# Patient Record
Sex: MALE | Race: WHITE | HISPANIC OR LATINO | URBAN - METROPOLITAN AREA
[De-identification: names, ages, dates, MRNs, and addresses within clinical notes are randomized per-mention and may not be internally consistent; named-entity substitution may affect disease eponyms.]

---

## 2019-08-15 ENCOUNTER — OUTPATIENT (OUTPATIENT)
Dept: OUTPATIENT SERVICES | Facility: HOSPITAL | Age: 74
LOS: 1 days | End: 2019-08-15
Payer: MEDICARE

## 2019-08-15 ENCOUNTER — LABORATORY RESULT (OUTPATIENT)
Age: 74
End: 2019-08-15

## 2019-08-15 ENCOUNTER — APPOINTMENT (OUTPATIENT)
Dept: INTERNAL MEDICINE | Facility: CLINIC | Age: 74
End: 2019-08-15
Payer: MEDICARE

## 2019-08-15 VITALS
HEIGHT: 65.5 IN | SYSTOLIC BLOOD PRESSURE: 142 MMHG | WEIGHT: 200 LBS | OXYGEN SATURATION: 98 % | BODY MASS INDEX: 32.92 KG/M2 | HEART RATE: 89 BPM | DIASTOLIC BLOOD PRESSURE: 80 MMHG

## 2019-08-15 VITALS — DIASTOLIC BLOOD PRESSURE: 70 MMHG | SYSTOLIC BLOOD PRESSURE: 132 MMHG

## 2019-08-15 DIAGNOSIS — M54.9 DORSALGIA, UNSPECIFIED: ICD-10-CM

## 2019-08-15 DIAGNOSIS — Z82.49 FAMILY HISTORY OF ISCHEMIC HEART DISEASE AND OTHER DISEASES OF THE CIRCULATORY SYSTEM: ICD-10-CM

## 2019-08-15 DIAGNOSIS — Z00.00 ENCOUNTER FOR GENERAL ADULT MEDICAL EXAMINATION W/OUT ABNORMAL FINDINGS: ICD-10-CM

## 2019-08-15 DIAGNOSIS — Z72.0 TOBACCO USE: ICD-10-CM

## 2019-08-15 DIAGNOSIS — E66.9 OBESITY, UNSPECIFIED: ICD-10-CM

## 2019-08-15 DIAGNOSIS — Z87.898 PERSONAL HISTORY OF OTHER SPECIFIED CONDITIONS: ICD-10-CM

## 2019-08-15 DIAGNOSIS — F17.200 NICOTINE DEPENDENCE, UNSPECIFIED, UNCOMPLICATED: ICD-10-CM

## 2019-08-15 DIAGNOSIS — Z78.9 OTHER SPECIFIED HEALTH STATUS: ICD-10-CM

## 2019-08-15 DIAGNOSIS — H93.11 TINNITUS, RIGHT EAR: ICD-10-CM

## 2019-08-15 DIAGNOSIS — Z87.39 PERSONAL HISTORY OF OTHER DISEASES OF THE MUSCULOSKELETAL SYSTEM AND CONNECTIVE TISSUE: ICD-10-CM

## 2019-08-15 DIAGNOSIS — I10 ESSENTIAL (PRIMARY) HYPERTENSION: ICD-10-CM

## 2019-08-15 LAB
ESTIMATED AVERAGE GLUCOSE: 103 MG/DL — SIGNIFICANT CHANGE UP (ref 68–114)
HBA1C BLD-MCNC: 5.2 % — SIGNIFICANT CHANGE UP (ref 4–5.6)
HCT VFR BLD CALC: 46.1 % — SIGNIFICANT CHANGE UP (ref 39–50)
HGB BLD-MCNC: 14.8 G/DL — SIGNIFICANT CHANGE UP (ref 13–17)
MCHC RBC-ENTMCNC: 29.3 PG — SIGNIFICANT CHANGE UP (ref 27–34)
MCHC RBC-ENTMCNC: 32.1 GM/DL — SIGNIFICANT CHANGE UP (ref 32–36)
MCV RBC AUTO: 91.3 FL — SIGNIFICANT CHANGE UP (ref 80–100)
PLATELET # BLD AUTO: 148 K/UL — LOW (ref 150–400)
RBC # BLD: 5.05 M/UL — SIGNIFICANT CHANGE UP (ref 4.2–5.8)
RBC # FLD: 13.9 % — SIGNIFICANT CHANGE UP (ref 10.3–14.5)
WBC # BLD: 6.92 K/UL — SIGNIFICANT CHANGE UP (ref 3.8–10.5)
WBC # FLD AUTO: 6.92 K/UL — SIGNIFICANT CHANGE UP (ref 3.8–10.5)

## 2019-08-15 PROCEDURE — 99204 OFFICE O/P NEW MOD 45 MIN: CPT | Mod: GC

## 2019-08-15 PROCEDURE — 90715 TDAP VACCINE 7 YRS/> IM: CPT

## 2019-08-15 PROCEDURE — 90670 PCV13 VACCINE IM: CPT

## 2019-08-15 PROCEDURE — 87389 HIV-1 AG W/HIV-1&-2 AB AG IA: CPT

## 2019-08-15 PROCEDURE — G0463: CPT | Mod: 25

## 2019-08-15 PROCEDURE — 80053 COMPREHEN METABOLIC PANEL: CPT

## 2019-08-15 PROCEDURE — G0009: CPT | Mod: XU

## 2019-08-15 PROCEDURE — 80061 LIPID PANEL: CPT

## 2019-08-15 PROCEDURE — 90471 IMMUNIZATION ADMIN: CPT

## 2019-08-15 PROCEDURE — 83036 HEMOGLOBIN GLYCOSYLATED A1C: CPT

## 2019-08-15 PROCEDURE — 85027 COMPLETE CBC AUTOMATED: CPT

## 2019-08-15 RX ORDER — PSYLLIUM SEED
48.57 PACKET (EA) ORAL
Refills: 0 | Status: ACTIVE | COMMUNITY
Start: 2019-08-15

## 2019-08-16 DIAGNOSIS — Z72.0 TOBACCO USE: ICD-10-CM

## 2019-08-16 DIAGNOSIS — H93.11 TINNITUS, RIGHT EAR: ICD-10-CM

## 2019-08-16 DIAGNOSIS — E66.9 OBESITY, UNSPECIFIED: ICD-10-CM

## 2019-08-16 DIAGNOSIS — Z00.00 ENCOUNTER FOR GENERAL ADULT MEDICAL EXAMINATION WITHOUT ABNORMAL FINDINGS: ICD-10-CM

## 2019-08-16 DIAGNOSIS — M54.9 DORSALGIA, UNSPECIFIED: ICD-10-CM

## 2019-08-16 LAB
ALBUMIN SERPL ELPH-MCNC: 4.1 G/DL — SIGNIFICANT CHANGE UP (ref 3.3–5)
ALP SERPL-CCNC: 80 U/L — SIGNIFICANT CHANGE UP (ref 40–120)
ALT FLD-CCNC: 18 U/L — SIGNIFICANT CHANGE UP (ref 10–45)
ANION GAP SERPL CALC-SCNC: 12 MMOL/L — SIGNIFICANT CHANGE UP (ref 5–17)
AST SERPL-CCNC: 14 U/L — SIGNIFICANT CHANGE UP (ref 10–40)
BILIRUB SERPL-MCNC: 0.4 MG/DL — SIGNIFICANT CHANGE UP (ref 0.2–1.2)
BUN SERPL-MCNC: 21 MG/DL — SIGNIFICANT CHANGE UP (ref 7–23)
CALCIUM SERPL-MCNC: 9.1 MG/DL — SIGNIFICANT CHANGE UP (ref 8.4–10.5)
CHLORIDE SERPL-SCNC: 107 MMOL/L — SIGNIFICANT CHANGE UP (ref 96–108)
CHOLEST SERPL-MCNC: 204 MG/DL — HIGH (ref 10–199)
CO2 SERPL-SCNC: 22 MMOL/L — SIGNIFICANT CHANGE UP (ref 22–31)
CREAT SERPL-MCNC: 0.96 MG/DL — SIGNIFICANT CHANGE UP (ref 0.5–1.3)
GLUCOSE SERPL-MCNC: 92 MG/DL — SIGNIFICANT CHANGE UP (ref 70–99)
HDLC SERPL-MCNC: 29 MG/DL — LOW
HIV 1+2 AB+HIV1 P24 AG SERPL QL IA: SIGNIFICANT CHANGE UP
LIPID PNL WITH DIRECT LDL SERPL: SIGNIFICANT CHANGE UP MG/DL
POTASSIUM SERPL-MCNC: 4.3 MMOL/L — SIGNIFICANT CHANGE UP (ref 3.5–5.3)
POTASSIUM SERPL-SCNC: 4.3 MMOL/L — SIGNIFICANT CHANGE UP (ref 3.5–5.3)
PROT SERPL-MCNC: 6.8 G/DL — SIGNIFICANT CHANGE UP (ref 6–8.3)
SODIUM SERPL-SCNC: 141 MMOL/L — SIGNIFICANT CHANGE UP (ref 135–145)
TOTAL CHOLESTEROL/HDL RATIO MEASUREMENT: 7 RATIO — SIGNIFICANT CHANGE UP (ref 3.4–9.6)
TRIGL SERPL-MCNC: 417 MG/DL — HIGH (ref 10–149)

## 2019-08-16 NOTE — HEALTH RISK ASSESSMENT
[] : Yes [0-5] : 0-5 [Fully functional (bathing, dressing, toileting, transferring, walking, feeding)] : Fully functional (bathing, dressing, toileting, transferring, walking, feeding) [Fully functional (using the telephone, shopping, preparing meals, housekeeping, doing laundry, using] : Fully functional and needs no help or supervision to perform IADLs (using the telephone, shopping, preparing meals, housekeeping, doing laundry, using transportation, managing medications and managing finances) [No falls in past year] : Patient reported no falls in the past year

## 2019-08-18 NOTE — ASSESSMENT
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 Counseled on avoiding NSAIDs given age\par \par #R tinnitus\par No red flag rhythmic pattern of tinnitus, gross hearing intact\par -Audiology referral given, if shows unilateral sensorineural hearing loss may need MRI brain to r/o schwannoma\par \par #Current some day smoker\par -Counseled on risks of smoking and recommended pt quit. Pt states he is ready to quit\par \par #HCM\par -GI referral for screening colo, pt has >10 years expected life expectancy\par -Prevnar and TDAP given, needs Pneumovax 8/2020, needs Shingrix\par -AAA screening referral given, 3.5 pack years\par -Depression screen neg\par \par RTC prn, CPE in 1 year, needs Pneumovax 8/2020\par D/w Dr. Guadalupe

## 2019-08-18 NOTE — HISTORY OF PRESENT ILLNESS
[de-identified] : 72yo M Hebrew-speaking with no sig PMHx presents to establish care after not coming to clinic since 2012.   #[id number], [id number].\par \par Reporting back pain which is intermittent upper back to base of skull which started 1/2019, had stress around time it started. This pain is causing HA of R temporal/parietal region and R ear. Back pain is worse when he bends neck forward and back. Has the upper back pain only when he bends his neck, bends forward, lifts something, or when turns over in sleep. Is unable to describe quality of pain but it's not shooting or electric pain and does not radiate down arms, severity 4/10. Has some high-pitched ringing in R ear which started before the neck pain. No numbness, tingling, weakness, falls, LOC, No longer gets palpitations, reports previous palpitations was during time of stress. Got X-rays of neck/back in [facility] and was told one of the discs was out of place affecting a nerve.\par \par Had colonoscopy once 10 years at [facility] which he reports was normal.\par \par PSHx: fell from house leading to compression fracture of Lumbar vertebra s/p metal plate, 1998\par \par SH: Lives in [facility], visits [place] for medical care, lives with brother, BRIANNA, niece. Staying with daughter in [place]. Smokes tob 4-5 days/week, 2-3 cig/day (1 pack every 2 weeks). Started age 19. +social etoh 1-2 beers. 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 Single, sexually active with 1 female partner. Goes dancing for fun. Ambulates without cane/walker.\par

## 2019-08-18 NOTE — PHYSICAL EXAM
[Normal Sclera/Conjunctiva] : normal sclera/conjunctiva [Normal Outer Ear/Nose] : the outer ears and nose were normal in appearance [Normal Oropharynx] : the oropharynx was normal [Supple] : supple [No Edema] : there was no peripheral edema [Normal] : soft, non-tender, non-distended, no masses palpated, no HSM and normal bowel sounds [No CVA Tenderness] : no CVA  tenderness [No Spinal Tenderness] : no spinal tenderness [No Joint Swelling] : no joint swelling [No Rash] : no rash [Coordination Grossly Intact] : coordination grossly intact [No Focal Deficits] : no focal deficits [Normal Gait] : normal gait [Normal Affect] : the affect was normal [Normal Insight/Judgement] : insight and judgment were intact [de-identified] : L [de-identified] : no stepoffs/deformities of spin [de-identified] : full neck AROM [de-identified] : b/l UE: sensation to light touch intact, strength 5/5

## 2019-08-18 NOTE — REVIEW OF SYSTEMS
[Headache] : headache [Negative] : Heme/Lymph [Earache] : no earache [Hearing Loss] : no hearing loss [Nosebleeds] : no nosebleeds [Postnasal Drip] : no postnasal drip [Nasal Discharge] : no nasal discharge [Sore Throat] : no sore throat [Hoarseness] : no hoarseness [Dizziness] : no dizziness [Fainting] : no fainting [Confusion] : no confusion [Unsteady Walk] : no ataxia [Memory Loss] : no memory loss [FreeTextEntry4] : +R tinnitus

## 2019-08-19 ENCOUNTER — OUTPATIENT (OUTPATIENT)
Dept: OUTPATIENT SERVICES | Facility: HOSPITAL | Age: 74
LOS: 1 days | End: 2019-08-19
Payer: MEDICARE

## 2019-08-19 ENCOUNTER — APPOINTMENT (OUTPATIENT)
Dept: ULTRASOUND IMAGING | Facility: CLINIC | Age: 74
End: 2019-08-19
Payer: MEDICARE

## 2019-08-19 DIAGNOSIS — Z72.0 TOBACCO USE: ICD-10-CM

## 2019-08-19 PROCEDURE — 76775 US EXAM ABDO BACK WALL LIM: CPT

## 2019-08-19 PROCEDURE — 76775 US EXAM ABDO BACK WALL LIM: CPT | Mod: 26

## 2019-08-22 ENCOUNTER — OUTPATIENT (OUTPATIENT)
Dept: OUTPATIENT SERVICES | Facility: HOSPITAL | Age: 74
LOS: 1 days | Discharge: HOME | End: 2019-08-22

## 2019-08-22 ENCOUNTER — APPOINTMENT (OUTPATIENT)
Dept: SPEECH THERAPY | Facility: CLINIC | Age: 74
End: 2019-08-22

## 2019-08-28 ENCOUNTER — APPOINTMENT (OUTPATIENT)
Dept: INTERNAL MEDICINE | Facility: CLINIC | Age: 74
End: 2019-08-28

## 2019-08-28 VITALS
SYSTOLIC BLOOD PRESSURE: 115 MMHG | HEART RATE: 79 BPM | BODY MASS INDEX: 32.61 KG/M2 | DIASTOLIC BLOOD PRESSURE: 90 MMHG | WEIGHT: 199 LBS | OXYGEN SATURATION: 97 %

## 2019-08-30 NOTE — END OF VISIT
[] : Resident [FreeTextEntry3] : 73 year-old M with PMH as above who presented to the clinic asking for a letter in Telugu about his health issues. No imaging available for review, [facility]. No current complaints.

## 2019-08-30 NOTE — PHYSICAL EXAM
[No Acute Distress] : no acute distress [Well-Appearing] : well-appearing [Normal Sclera/Conjunctiva] : normal sclera/conjunctiva [EOMI] : extraocular movements intact [Normal Outer Ear/Nose] : the outer ears and nose were normal in appearance [Normal Oropharynx] : the oropharynx was normal [Supple] : supple [No Respiratory Distress] : no respiratory distress  [No Accessory Muscle Use] : no accessory muscle use [Normal Rate] : normal rate  [Clear to Auscultation] : lungs were clear to auscultation bilaterally [Regular Rhythm] : with a regular rhythm [Normal S1, S2] : normal S1 and S2 [No Edema] : there was no peripheral edema [No Extremity Clubbing/Cyanosis] : no extremity clubbing/cyanosis [Soft] : abdomen soft [Non Tender] : non-tender [Non-distended] : non-distended [No Joint Swelling] : no joint swelling [Grossly Normal Strength/Tone] : grossly normal strength/tone [No Rash] : no rash [Coordination Grossly Intact] : coordination grossly intact [No Focal Deficits] : no focal deficits [Normal Gait] : normal gait [Normal Affect] : the affect was normal [Normal Insight/Judgement] : insight and judgment were intact

## 2019-08-30 NOTE — DATA REVIEWED
[No studies available for review at this time.] : No studies available for review at this time. [FreeTextEntry1] : No imaging studies available for review at this time.

## 2019-08-30 NOTE — HISTORY OF PRESENT ILLNESS
[FreeTextEntry1] : Follow up [de-identified] : The patient is a 72 yo man with R tinnitus here for follow up.  He has a trip next week on the 9th to [facility] and needs a medical letter regarding management of his neck pain. He needs a letter from when he was in the clinic . Tomorrow have an appointment for physical therapy. The place where you work in [facility]. Needs the letter to be in Tunisian. There are 4 in the office, from there here for permission to be seen for the neck. No trauma to the neck, no fall, hurts with movement.  No xray imaging here to review.\par

## 2019-09-06 DIAGNOSIS — E78.1 PURE HYPERGLYCERIDEMIA: ICD-10-CM

## 2019-09-06 DIAGNOSIS — E78.5 HYPERLIPIDEMIA, UNSPECIFIED: ICD-10-CM

## 2019-09-06 RX ORDER — ATORVASTATIN CALCIUM 40 MG/1
40 TABLET, FILM COATED ORAL DAILY
Qty: 60 | Refills: 0 | Status: ACTIVE | COMMUNITY
Start: 2019-09-06 | End: 1900-01-01

## 2019-10-02 DIAGNOSIS — H90.3 SENSORINEURAL HEARING LOSS, BILATERAL: ICD-10-CM

## 2020-01-21 ENCOUNTER — APPOINTMENT (OUTPATIENT)
Dept: GASTROENTEROLOGY | Facility: HOSPITAL | Age: 75
End: 2020-01-21

## 2024-01-20 ENCOUNTER — EMERGENCY (EMERGENCY)
Facility: HOSPITAL | Age: 79
LOS: 1 days | Discharge: HOME | End: 2024-01-20
Attending: EMERGENCY MEDICINE
Payer: MEDICARE

## 2024-01-20 ENCOUNTER — EMERGENCY (EMERGENCY)
Facility: HOSPITAL | Age: 79
LOS: 1 days | Discharge: HOME | End: 2024-01-20
Attending: EMERGENCY MEDICINE | Admitting: EMERGENCY MEDICINE
Payer: MEDICARE

## 2024-01-20 VITALS
TEMPERATURE: 97 F | RESPIRATION RATE: 18 BRPM | SYSTOLIC BLOOD PRESSURE: 153 MMHG | HEART RATE: 82 BPM | OXYGEN SATURATION: 97 % | DIASTOLIC BLOOD PRESSURE: 79 MMHG

## 2024-01-20 VITALS
DIASTOLIC BLOOD PRESSURE: 75 MMHG | HEART RATE: 82 BPM | OXYGEN SATURATION: 97 % | TEMPERATURE: 98 F | RESPIRATION RATE: 18 BRPM | SYSTOLIC BLOOD PRESSURE: 135 MMHG

## 2024-01-20 VITALS
DIASTOLIC BLOOD PRESSURE: 79 MMHG | WEIGHT: 199.96 LBS | OXYGEN SATURATION: 98 % | RESPIRATION RATE: 18 BRPM | HEART RATE: 82 BPM | SYSTOLIC BLOOD PRESSURE: 161 MMHG | TEMPERATURE: 97 F | HEIGHT: 65.5 IN

## 2024-01-20 VITALS
SYSTOLIC BLOOD PRESSURE: 127 MMHG | TEMPERATURE: 98 F | OXYGEN SATURATION: 98 % | RESPIRATION RATE: 20 BRPM | HEIGHT: 65.5 IN | DIASTOLIC BLOOD PRESSURE: 77 MMHG | HEART RATE: 83 BPM | WEIGHT: 199.96 LBS

## 2024-01-20 LAB
ALBUMIN SERPL ELPH-MCNC: 4.2 G/DL — SIGNIFICANT CHANGE UP (ref 3.3–5)
ALP SERPL-CCNC: 108 U/L — SIGNIFICANT CHANGE UP (ref 40–120)
ALT FLD-CCNC: 28 U/L — SIGNIFICANT CHANGE UP (ref 10–45)
ANION GAP SERPL CALC-SCNC: 12 MMOL/L — SIGNIFICANT CHANGE UP (ref 5–17)
AST SERPL-CCNC: 18 U/L — SIGNIFICANT CHANGE UP (ref 10–40)
BASOPHILS # BLD AUTO: 0.03 K/UL — SIGNIFICANT CHANGE UP (ref 0–0.2)
BASOPHILS NFR BLD AUTO: 0.6 % — SIGNIFICANT CHANGE UP (ref 0–2)
BILIRUB SERPL-MCNC: 0.6 MG/DL — SIGNIFICANT CHANGE UP (ref 0.2–1.2)
BUN SERPL-MCNC: 14 MG/DL — SIGNIFICANT CHANGE UP (ref 7–23)
CALCIUM SERPL-MCNC: 9.8 MG/DL — SIGNIFICANT CHANGE UP (ref 8.4–10.5)
CHLORIDE SERPL-SCNC: 104 MMOL/L — SIGNIFICANT CHANGE UP (ref 96–108)
CO2 SERPL-SCNC: 23 MMOL/L — SIGNIFICANT CHANGE UP (ref 22–31)
CREAT SERPL-MCNC: 0.83 MG/DL — SIGNIFICANT CHANGE UP (ref 0.5–1.3)
EGFR: 89 ML/MIN/1.73M2 — SIGNIFICANT CHANGE UP
EOSINOPHIL # BLD AUTO: 0.05 K/UL — SIGNIFICANT CHANGE UP (ref 0–0.5)
EOSINOPHIL NFR BLD AUTO: 1 % — SIGNIFICANT CHANGE UP (ref 0–6)
GLUCOSE SERPL-MCNC: 89 MG/DL — SIGNIFICANT CHANGE UP (ref 70–99)
HCT VFR BLD CALC: 45.5 % — SIGNIFICANT CHANGE UP (ref 39–50)
HGB BLD-MCNC: 14.7 G/DL — SIGNIFICANT CHANGE UP (ref 13–17)
IMM GRANULOCYTES NFR BLD AUTO: 1.9 % — HIGH (ref 0–0.9)
LIDOCAIN IGE QN: 23 U/L — SIGNIFICANT CHANGE UP (ref 7–60)
LYMPHOCYTES # BLD AUTO: 1.92 K/UL — SIGNIFICANT CHANGE UP (ref 1–3.3)
LYMPHOCYTES # BLD AUTO: 40 % — SIGNIFICANT CHANGE UP (ref 13–44)
MCHC RBC-ENTMCNC: 29 PG — SIGNIFICANT CHANGE UP (ref 27–34)
MCHC RBC-ENTMCNC: 32.3 GM/DL — SIGNIFICANT CHANGE UP (ref 32–36)
MCV RBC AUTO: 89.7 FL — SIGNIFICANT CHANGE UP (ref 80–100)
MONOCYTES # BLD AUTO: 0.33 K/UL — SIGNIFICANT CHANGE UP (ref 0–0.9)
MONOCYTES NFR BLD AUTO: 6.9 % — SIGNIFICANT CHANGE UP (ref 2–14)
NEUTROPHILS # BLD AUTO: 2.38 K/UL — SIGNIFICANT CHANGE UP (ref 1.8–7.4)
NEUTROPHILS NFR BLD AUTO: 49.6 % — SIGNIFICANT CHANGE UP (ref 43–77)
NRBC # BLD: 0 /100 WBCS — SIGNIFICANT CHANGE UP (ref 0–0)
PLATELET # BLD AUTO: 187 K/UL — SIGNIFICANT CHANGE UP (ref 150–400)
POTASSIUM SERPL-MCNC: 4.2 MMOL/L — SIGNIFICANT CHANGE UP (ref 3.5–5.3)
POTASSIUM SERPL-SCNC: 4.2 MMOL/L — SIGNIFICANT CHANGE UP (ref 3.5–5.3)
PROT SERPL-MCNC: 7.9 G/DL — SIGNIFICANT CHANGE UP (ref 6–8.3)
RBC # BLD: 5.07 M/UL — SIGNIFICANT CHANGE UP (ref 4.2–5.8)
RBC # FLD: 14.4 % — SIGNIFICANT CHANGE UP (ref 10.3–14.5)
SODIUM SERPL-SCNC: 139 MMOL/L — SIGNIFICANT CHANGE UP (ref 135–145)
WBC # BLD: 4.8 K/UL — SIGNIFICANT CHANGE UP (ref 3.8–10.5)
WBC # FLD AUTO: 4.8 K/UL — SIGNIFICANT CHANGE UP (ref 3.8–10.5)

## 2024-01-20 PROCEDURE — 99284 EMERGENCY DEPT VISIT MOD MDM: CPT | Mod: 25

## 2024-01-20 PROCEDURE — 85025 COMPLETE CBC W/AUTO DIFF WBC: CPT

## 2024-01-20 PROCEDURE — 96374 THER/PROPH/DIAG INJ IV PUSH: CPT

## 2024-01-20 PROCEDURE — 93005 ELECTROCARDIOGRAM TRACING: CPT

## 2024-01-20 PROCEDURE — 99284 EMERGENCY DEPT VISIT MOD MDM: CPT | Mod: GC

## 2024-01-20 PROCEDURE — 80053 COMPREHEN METABOLIC PANEL: CPT

## 2024-01-20 PROCEDURE — 99285 EMERGENCY DEPT VISIT HI MDM: CPT | Mod: FS

## 2024-01-20 PROCEDURE — 83690 ASSAY OF LIPASE: CPT

## 2024-01-20 PROCEDURE — 84484 ASSAY OF TROPONIN QUANT: CPT

## 2024-01-20 RX ORDER — FAMOTIDINE 10 MG/ML
20 INJECTION INTRAVENOUS ONCE
Refills: 0 | Status: COMPLETED | OUTPATIENT
Start: 2024-01-20 | End: 2024-01-20

## 2024-01-20 RX ADMIN — FAMOTIDINE 20 MILLIGRAM(S): 10 INJECTION INTRAVENOUS at 15:46

## 2024-01-20 NOTE — ED PROVIDER NOTE - OBJECTIVE STATEMENT
78-year-old male with past medical history of BPH presenting with abdominal pain.  Patient reports that he has had months to years of epigastric abdominal pain.  Pain is described as a burning sensation.  Symptoms worse after eating.  Patient has been taking Pepcid for symptoms with improvement.  Patient recently traveled back from [facility] and wanted to get checked out to make sure it was nothing more serious.  Denies fever/chills, chest pain, shortness of breath, vomiting, diarrhea.

## 2024-01-20 NOTE — ED PROVIDER NOTE - PROGRESS NOTE DETAILS
Rosalva CARRASCO, PGY-1;    Patient states he has to leave at 11:30 to give his daughter the car. Explained to patient that he should stay for workup. He was asking if he can leave and come back. Described that patient would have to signout AMA because he was evaluated by physcians, and we already started our workup. Patient will signout AMA and states he will return. Patient is understanding of risks of leaving and is capable of making decisions at this time.

## 2024-01-20 NOTE — ED PROVIDER NOTE - NSFOLLOWUPINSTRUCTIONS_ED_ALL_ED_FT
Stay well-hydrated   Avoid fried, fatty, spicy foods.  Continue famotidine 20 mg daily as needed for abdominal pain.  Follow-up with your primary care provider in 2 to 3 days.  Bring copy of results with you to your appointment.  Follow-up with gastroenterology within 1 to 2 weeks.  Someone will call you in the next few days to help you schedule an appointment.  Return to the ER for worsening pain, chest pain, shortness of breath, black/bloody stools or any other concerning symptoms.

## 2024-01-20 NOTE — ED ADULT NURSE NOTE - CAS ED AMA REASON YN
The patient presents with a possible septic syndrome.  Prior to initiation of any further chemotherapy will make sure that the patient is medically stable before initiation of treatment will discuss with primary oncologist 1 situation is clarified.  Patient and rapid ventricular response major fibrillation medically not stable at this point will need to discuss when patient is medical stable as to whether or not to proceed with treatment for third line treatment for acute myelogenous leukemia with primary oncologist   Yes

## 2024-01-20 NOTE — ED PROVIDER NOTE - CLINICAL SUMMARY MEDICAL DECISION MAKING FREE TEXT BOX
78-year-old male with past medical history of BPH presents with 1 year of epigastric chest burning.  Patient states he has been taking famotidine at home with no relief of symptoms.  Patient primarily lives in [facility] however, is losing a states about 3 months of the year.  Patient states he has had an endoscopy in the past for over 20 years ago.  He denies nausea, vomiting, chest pain, shortness of breath, dizziness, lightheadedness, headache.  Endorses black stool about a year or 2 ago while he was in [facility] but he states this is because he was taking a medicine which his physician told him to stop and he has not noticed any black or dark stools since. Patient HD stable on arrival. PE with no abdominal or CVA tenderness. Will order ACS workup, ECG, antacids and reassess.

## 2024-01-20 NOTE — ED ADULT NURSE NOTE - NSFALLUNIVINTERV_ED_ALL_ED
Bed/Stretcher in lowest position, wheels locked, appropriate side rails in place/Call bell, personal items and telephone in reach/Instruct patient to call for assistance before getting out of bed/chair/stretcher/Non-slip footwear applied when patient is off stretcher/[place] to call system/Physically safe environment - no spills, clutter or unnecessary equipment/Purposeful proactive rounding/Room/bathroom lighting operational, light cord in reach

## 2024-01-20 NOTE — ED PROVIDER NOTE - NSFOLLOWUPCLINICS_GEN_ALL_ED_FT
[facility]  Gastroenterology  [address] [place] [address] [address]  [place] [address]  Phone: [phone]  Fax:

## 2024-01-20 NOTE — ED PROVIDER NOTE - PATIENT PORTAL LINK FT
You can access the FollowMyHealth Patient Portal offered by [facility] by registering at the following website: [email or web address]. By joining [employer]’s FollowMyHealth portal, you will also be able to view your health information using other applications (apps) compatible with our system.

## 2024-01-20 NOTE — ED ADULT NURSE NOTE - OBJECTIVE STATEMENT
Pt is 78y M with PMH GERD complaining of epigastric pain. Pt reports onset of acid-like discomfort in epigastric region x few months, worsening after eating. Reports partial relief from famotidine, but reported to [facility] today for worsening pain today. Upon assessment, A&Ox4, endorses current acid-like epigastric pain, denies n/v, abdominal tenderness. Vitals WNL.

## 2024-01-20 NOTE — ED ADULT NURSE NOTE - OBJECTIVE STATEMENT
1105 covering RN (1100 to 1130) 79yo M aaox4 h/o HTN, presents to ED from home, pt reports abdominal pain x1 year , Pt denies CP, SOB, HA, vision changes, n/v/d, fevers chills, weakness. Patient signed out of ED AMA. MD explained risks of signing out AMA to patient. Patient verbalized understanding of risks. Pt A&Ox3. VSS. Patient ambulated out of ED well, steady gait, unassisted.

## 2024-01-20 NOTE — ED PROVIDER NOTE - OBJECTIVE STATEMENT
78-year-old male with past medical history of BPH presents with 1 year of epigastric chest burning.  Patient states he has been taking famotidine at home with no relief of symptoms.  Patient primarily lives in [facility] however, is losing a states about 3 months of the year.  Patient states he has had an endoscopy in the past for over 20 years ago.  He denies nausea, vomiting, chest pain, shortness of breath, dizziness, lightheadedness, headache.  Endorses black stool about a year or 2 ago while he was in [place] but he states this is because he was taking a medicine which his physician told him to stop and he has not noticed any black or dark stools since.

## 2024-01-20 NOTE — ED PROVIDER NOTE - PROGRESS NOTE DETAILS
Labs unremarkable.  Trope negative.  Lipase negative.  Patient reports symptoms improved status post Pepcid/Maalox.  Abdomen soft and nontender.  Will refer for outpatient GI follow-up.  Strict return precautions provided. -Vu Dumont PA-C

## 2024-01-20 NOTE — ED PROVIDER NOTE - PATIENT PORTAL LINK FT
You can access the FollowMyHealth Patient Portal offered by [facility] by registering at the following website: [email or web address]. By joining [employer]’s FollowMyHealth portal, you will also be able to view your health information using other applications (apps) compatible with our system.

## 2024-01-20 NOTE — ED PROVIDER NOTE - ATTENDING APP SHARED VISIT CONTRIBUTION OF CARE
benign abdominal exam and not cw acs. Symptomatic treatment. cbc, cmp, lipase, trop. ekg from this am nl. refer to gi for poss scope.

## 2024-01-20 NOTE — ED PROVIDER NOTE - ATTENDING CONTRIBUTION TO CARE
RGUJRAL 77yo male hx BPH presents with epigastric burning for about 1 year. States he lives here in  and [facility], has been previously evaluated for symptoms and given pepcid. States he has symptoms intermittently and improved with medication. Denies any n/v. Tolerating PO. Had episode of melena year ago not recently. No diarrhea. Denies any sob, chest pain. States he came in today to find out more about his symptoms and establish care.   On exam, Patient is awake,alert,oriented x 3. Patient is well appearing and in no acute distress. Patient's chest is clear to ausculation, +s1s2. Abdomen is soft nd/nt +BS. Extremity with no swelling or calf tenderness.   EKG reviewed. Pt states he has to leave at this time because he is using his daughters car and will return.   Discussed medications and follow up and states he will return later today.

## 2024-01-26 ENCOUNTER — APPOINTMENT (OUTPATIENT)
Dept: GASTROENTEROLOGY | Facility: CLINIC | Age: 79
End: 2024-01-26
Payer: MEDICARE

## 2024-01-26 VITALS
BODY MASS INDEX: 32.45 KG/M2 | HEART RATE: 87 BPM | SYSTOLIC BLOOD PRESSURE: 128 MMHG | WEIGHT: 198 LBS | DIASTOLIC BLOOD PRESSURE: 72 MMHG | OXYGEN SATURATION: 98 %

## 2024-01-26 DIAGNOSIS — K21.9 GASTRO-ESOPHAGEAL REFLUX DISEASE W/OUT ESOPHAGITIS: ICD-10-CM

## 2024-01-26 DIAGNOSIS — K62.5 HEMORRHAGE OF ANUS AND RECTUM: ICD-10-CM

## 2024-01-26 DIAGNOSIS — K59.00 CONSTIPATION, UNSPECIFIED: ICD-10-CM

## 2024-01-26 PROCEDURE — 99205 OFFICE O/P NEW HI 60 MIN: CPT

## 2024-01-26 RX ORDER — SODIUM SULFATE, POTASSIUM SULFATE AND MAGNESIUM SULFATE 1.6; 3.13; 17.5 G/177ML; G/177ML; G/177ML
17.5-3.13-1.6 SOLUTION ORAL
Qty: 2 | Refills: 0 | Status: ACTIVE | COMMUNITY
Start: 2024-01-26 | End: 1900-01-01

## 2024-01-26 RX ORDER — OMEPRAZOLE 40 MG/1
40 CAPSULE, DELAYED RELEASE ORAL
Qty: 90 | Refills: 1 | Status: ACTIVE | COMMUNITY
Start: 2024-01-26 | End: 1900-01-01

## 2024-01-26 RX ORDER — POLYETHYLENE GLYCOL 3350 17 G/17G
17 POWDER, FOR SOLUTION ORAL DAILY
Qty: 30 | Refills: 1 | Status: ACTIVE | COMMUNITY
Start: 2024-01-26 | End: 1900-01-01

## 2024-01-26 NOTE — PHYSICAL EXAM
[No Acute Distress] : no acute distress [Obese (BMI >= 30)] : obese (BMI >= 30) [No Respiratory Distress] : no respiratory distress [Heart Rate And Rhythm] : heart rate was normal and rhythm regular [Abdomen Tenderness] : non-tender [No Masses] : no abdominal mass palpated [Abdomen Soft] : soft

## 2024-01-26 NOTE — ASSESSMENT
[FreeTextEntry1] : 78M with HLD and back pain presents with epigastric abdominal pain that is new in the last 2 years, constipation and rectal bleeding.  #Epigastric abdominal pain #Heartburn DDx includes GERD, hiatal hernia, gastritis, PUD. Will proceed with upper endoscopy with biopsy to evaluate new onset pain in a patient over 64 yo. Also provided rx for omeprazole 40 mg daily, and instructed him to start this medication AFTER the EGD or to hold it for 10 days prior to the EGD, depending on when it is scheduled.  #Constipation Discussed increased water intake (>65 oz daily) and initiation of miralax once to twice daily  #Rectal bleeding DDx includes internal hemorrhoids, AVM, inflammation, malignancy. Overdue for colonoscopy, has not had any age appropriate CRC screening. Will proceed with colonoscopy with Suprep prep at time of EGD to evaluate for cause of rectal bleeding and to screen for CRC and advanced polyps.  I spent 50 minutes in conversation with the patient and 15 minutes on documentation for a total of 65 minutes for this encounter.

## 2024-01-26 NOTE — HISTORY OF PRESENT ILLNESS
[FreeTextEntry1] : Mr. Tomy Lagunas is a 77 yo [place]er-speaking man with HLD, obesity and LBP who presented to [facility] ED on 1/20/24 for evaluation of "months to years" of epigastric abdominal pain that improves with Pepcid and maalox. Normal CBC/CMP/trop in ED, EKG NSR. Of note he is a somewhat difficult historian (circular). Visit conducted with aid of a .  Today he reports pain just below his xiphoid process which is burning in character and worse postprandially and at night. Pain is new over the last 18-24 months, has not had before. Pepcid will alleviate the pain for several hours but then it recurs. He also has heartburn, unclear how frequently this occurs. No water brash or regurgitation. Will have intermittent low chest pain with swallowing where passing food will feel like it is "touching a wound." No dysphagia, no unintentional weight loss, no nausea or vomiting. Notes coffee may be a trigger but has not noticed other food triggers. Sleeping on his right side worsens pain. States he has been gaining weight though on chart review weight is stable from 2019. No chest pain or difficulty breathing.  Has back pain for which he takes Motrin once daily at most a few times a week, not every day, will take it up to three days in a row. No other NSAIDs. No family history of stomach or colon cancer, +former smoker, half pack a day for ~25 years, quit 2 years ago.  Also reports "difficulty with digestion," which is clarified to mean constipation after further conversation. He will feel like he is filled with stool but will go to the bathroom and not be able to defecate. He has intermittently had this problem for some time. He was taking an unspecified laxative twice a day a few weeks ago which was helping but he has run out of this and twice daily Metamucil is not helping. When constipated has a dry, hard [place] 1 bowel movement every other to every two days, associated with straining. At baseline has formed soft brown bowel movements daily.   No blood in the stool recently, however a few months ago in [facility] he was given some type of medication "for his stomach" and after taking this medication for two days he had dark red blood intermixed in a brown bowel movement which resolved when he stopped taking the medication. No black stools. "Many" years ago he had some type of endoscopic procedure in [place], believes they went in his anus but does not recall prepping and reports that they told him about his stomach not his colon. In any event, was told that he had a normal stomach and he had external hemorrhoids which were treated with creams.

## 2024-02-20 ENCOUNTER — TRANSCRIPTION ENCOUNTER (OUTPATIENT)
Age: 79
End: 2024-02-20

## 2024-02-20 ENCOUNTER — RESULT REVIEW (OUTPATIENT)
Age: 79
End: 2024-02-20

## 2024-02-20 ENCOUNTER — APPOINTMENT (OUTPATIENT)
Dept: GASTROENTEROLOGY | Facility: HOSPITAL | Age: 79
End: 2024-02-20

## 2024-02-20 ENCOUNTER — OUTPATIENT (OUTPATIENT)
Dept: OUTPATIENT SERVICES | Facility: HOSPITAL | Age: 79
LOS: 1 days | End: 2024-02-20
Payer: MEDICARE

## 2024-02-20 VITALS
RESPIRATION RATE: 17 BRPM | HEART RATE: 82 BPM | DIASTOLIC BLOOD PRESSURE: 62 MMHG | TEMPERATURE: 97 F | WEIGHT: 199.96 LBS | SYSTOLIC BLOOD PRESSURE: 139 MMHG | OXYGEN SATURATION: 99 % | HEIGHT: 65 IN

## 2024-02-20 VITALS
OXYGEN SATURATION: 100 % | DIASTOLIC BLOOD PRESSURE: 65 MMHG | HEART RATE: 69 BPM | RESPIRATION RATE: 14 BRPM | SYSTOLIC BLOOD PRESSURE: 138 MMHG

## 2024-02-20 DIAGNOSIS — K62.5 HEMORRHAGE OF ANUS AND RECTUM: ICD-10-CM

## 2024-02-20 DIAGNOSIS — Z98.890 OTHER SPECIFIED POSTPROCEDURAL STATES: Chronic | ICD-10-CM

## 2024-02-20 DIAGNOSIS — Z90.49 ACQUIRED ABSENCE OF OTHER SPECIFIED PARTS OF DIGESTIVE TRACT: Chronic | ICD-10-CM

## 2024-02-20 DIAGNOSIS — K21.9 GASTRO-ESOPHAGEAL REFLUX DISEASE WITHOUT ESOPHAGITIS: ICD-10-CM

## 2024-02-20 PROCEDURE — 88300 SURGICAL PATH GROSS: CPT

## 2024-02-20 PROCEDURE — 88305 TISSUE EXAM BY PATHOLOGIST: CPT

## 2024-02-20 PROCEDURE — 88300 SURGICAL PATH GROSS: CPT | Mod: 26

## 2024-02-20 PROCEDURE — 45385 COLONOSCOPY W/LESION REMOVAL: CPT

## 2024-02-20 PROCEDURE — 43239 EGD BIOPSY SINGLE/MULTIPLE: CPT

## 2024-02-20 PROCEDURE — 88305 TISSUE EXAM BY PATHOLOGIST: CPT | Mod: 26

## 2024-02-20 PROCEDURE — 43239 EGD BIOPSY SINGLE/MULTIPLE: CPT | Mod: 59

## 2024-02-20 PROCEDURE — 45380 COLONOSCOPY AND BIOPSY: CPT | Mod: 59

## 2024-02-20 RX ORDER — SODIUM CHLORIDE 9 MG/ML
500 INJECTION INTRAMUSCULAR; INTRAVENOUS; SUBCUTANEOUS
Refills: 0 | Status: DISCONTINUED | OUTPATIENT
Start: 2024-02-20 | End: 2024-03-05

## 2024-02-20 NOTE — ASU PREOP CHECKLIST - WEIGHT IN LBS
[facility] ONCOLOGY  Hematology/Oncology Follow Up Note     Patient: Erica Bhatt Age: 73 year old  MRN: [id number]     Date of Visit: April 6, 2021     Chief Complaint:   Ampullary carcinoma  Cancer History:  Cancer Staging  Ampullary carcinoma (CMS/HCC)  Staging form: Ampulla of Vater, AJCC 8th Edition  - Pathologic stage from 2/2/2021: Stage IIIA (pT3b, pN1, cM0) - Signed by Jack Oneill MD on 2/20/2021      Oncology History   Ampullary carcinoma (CMS/HCC)   2/2/2021 -  Cancer Staged    Staging form: Ampulla of Vater, AJCC 8th Edition  - Pathologic stage from 2/2/2021: Stage IIIA (pT3b, pN1, cM0) - Signed by Jack Oneill MD on 2/20/2021 2/20/2021 Initial Diagnosis    Ampullary carcinoma (CMS/HCC)     3/23/2021 -  Chemotherapy    irinotecan (CAMPTOSAR) 224 mg in sodium chloride 0.9 % 500 mL chemo infusion, 2 of 12 cycles  Administration: 224 mg (3/23/2021)  oxaliplatin (ELOXATIN) 127 mg in dextrose 5 % 250 mL chemo infusion, 2 of 12 cycles  Administration: 127 mg (3/23/2021)          SUBJECTIVE     HPI:  Erica Bhatt is a 73 year old female who presents to the office with her  in follow-up for ampullary carcinoma. She is currently receiving FOLFIRINOX and is here today prior to cycle 2. She had nausea, vomiting, and diarrhea following cycle 1. She received IV hydration after a few days of this which helped significantly. She did not experience much in terms of cold sensitivity. She has felt well this week. She denies fevers or chills. She has not had stomatitis. She has been able to eat well this week. She denies bleeding or bruising.       Review of Systems:  A 10 point review of systems was conducted and is negative unless mentioned in HPI.    Current Outpatient Medications   Medication Sig Dispense Refill   • ergocalciferol (DRISDOL) 1.25 mg (50,000 units) capsule ergocalciferol (vitamin D2) 1,250 mcg (50,000 unit) capsule   TK 1 C PO WEEKLY     • lidocaine-prilocaine (EMLA) cream Apply to  port 30 min prior to appointment. 30 g 0   • prochlorperazine (COMPAZINE) 10 MG tablet Take 1 tablet by mouth every 6 hours as needed for Nausea. 30 tablet 3   • ondansetron (Zofran) 4 MG tablet Take 1 tablet by mouth every 8 hours as needed for Nausea. 30 tablet 3   • lipase-protease-amylase 12,000-38,000-60,000 units (CREON) 19165 units per capsule Take 2 capsules by mouth 3 times daily (with meals). Take 1 capsule with snacks 270 capsule 3   • pantoprazole (PROTONIX) 20 MG tablet Take 40 mg by mouth daily.     • zolpidem (AMBIEN) 5 MG tablet Take 1 tablet by mouth nightly as needed for Sleep. 90 tablet 0   • venlafaxine XR (EFFEXOR XR) 150 MG 24 hr capsule Take 1 capsule by mouth daily. 90 capsule 0   • atorvastatin (LIPITOR) 20 MG tablet TAKE 1 TABLET BY MOUTH DAILY 90 tablet 3     Current Facility-Administered Medications   Medication Dose Route Frequency Provider Last Rate Last Admin   • dextrose 5 % flush IVPB 250 mL  250 mL Intravenous Once PRN Jill M Behm, CNP       • heparin 100 UNIT/ML lock flush 500 Units  5 mL Intracatheter Once PRN Jill M Behm, CNP   500 Units at 04/06/21 1030   • heparin 100 UNIT/ML lock flush 500 Units  5 mL Intracatheter Once Kori LUX Behm, CNP         ALLERGIES:   Allergen Reactions   • Penicillins THROAT SWELLING   • Cefaclor HIVES   • Quetiapine Fumarate Other (See Comments)     Dry mouth         OBJECTIVE    Visit Vitals  /73 (BP Location: LUE - Left upper extremity, Patient Position: Sitting, Cuff Size: Regular)   Pulse 96   Temp 97.6 °F (36.4 °C)   Ht 5' 3\" (1.6 m)   Wt 47.6 kg (104 lb 14.4 oz)   BMI 18.58 kg/m²       Physical Examination  Performance Status: ECOG 0  General: Well-developed, well-nourished, in no acute distress  HEENT: normocephalic, without obvious abnormality, no scleral icterus, oropharynx clear without exudate  Lungs: clear to auscultation bilaterally  Heart: regular rate and rhythm, S1 and S2 normal  Abdomen: soft, non-tender, non-distended, no  organomegaly  Extremities: no clubbing, cyanosis, edema  Musculoskeletal: symmetrical strength and tone  Skin: skin color and turgor normal; no rash, purpura, or petechiae  Neurologic: oriented x 3, no focal deficits  Psych: mood and affect normal    Laboratory/Imaging    Lab Services on 03/30/2021   Component Date Value   • Sodium 03/30/2021 135    • Potassium 03/30/2021 3.8    • Chloride 03/30/2021 102    • Carbon Dioxide 03/30/2021 25    • Anion Gap 03/30/2021 12    • Glucose 03/30/2021 123*   • BUN 03/30/2021 11    • Creatinine 03/30/2021 0.48*   • Glomerular Filtration Ra* 03/30/2021 >90    • BUN/ Creatinine Ratio 03/30/2021 23    • Calcium 03/30/2021 9.4    • Bilirubin, Total 03/30/2021 0.6    • GOT/AST 03/30/2021 26    • GPT/ALT 03/30/2021 42    • Alkaline Phosphatase 03/30/2021 130*   • Albumin 03/30/2021 3.7    • Protein, Total 03/30/2021 6.9    • Globulin 03/30/2021 3.2    • A/G Ratio 03/30/2021 1.2    • WBC 03/30/2021 6.0    • RBC 03/30/2021 4.32    • HGB 03/30/2021 12.0    • HCT 03/30/2021 35.7*   • MCV 03/30/2021 82.6    • MCH 03/30/2021 27.8    • MCHC 03/30/2021 33.6    • RDW-CV 03/30/2021 12.7    • RDW-SD 03/30/2021 39.0    • PLT 03/30/2021 180    • Neutrophil, Percent 03/30/2021 64    • Lymphocytes, Percent 03/30/2021 25    • Mono, Percent 03/30/2021 3    • Eosinophils, Percent 03/30/2021 6    • Basophils, Percent 03/30/2021 1    • Immature Granulocytes 03/30/2021 1    • Absolute Neutrophils 03/30/2021 3.8    • Absolute Lymphocytes 03/30/2021 1.5    • Absolute Monocytes 03/30/2021 0.2*   • Absolute Eosinophils  03/30/2021 0.4    • Absolute Basophils 03/30/2021 0.0    • Absolute Immmature Granu* 03/30/2021 0.1    Orders Only on 03/23/2021   Component Date Value   • Magnesium 03/23/2021 2.1    Office Visit on 03/23/2021   Component Date Value   • Sodium 03/23/2021 138    • Potassium 03/23/2021 3.9    • Chloride 03/23/2021 105    • Carbon Dioxide 03/23/2021 27    • Anion Gap 03/23/2021 10    • Glucose  03/23/2021 113*   • BUN 03/23/2021 8    • Creatinine 03/23/2021 0.62    • Glomerular Filtration Ra* 03/23/2021 89*   • BUN/ Creatinine Ratio 03/23/2021 13    • Calcium 03/23/2021 8.7    • Bilirubin, Total 03/23/2021 0.6    • GOT/AST 03/23/2021 34    • GPT/ALT 03/23/2021 42    • Alkaline Phosphatase 03/23/2021 130*   • Albumin 03/23/2021 3.3*   • Protein, Total 03/23/2021 6.3*   • Globulin 03/23/2021 3.0    • A/G Ratio 03/23/2021 1.1    • WBC 03/23/2021 5.2    • RBC 03/23/2021 3.77*   • HGB 03/23/2021 10.6*   • HCT 03/23/2021 32.3*   • MCV 03/23/2021 85.7    • MCH 03/23/2021 28.1    • MCHC 03/23/2021 32.8    • RDW-CV 03/23/2021 13.3    • RDW-SD 03/23/2021 42.2    • PLT 03/23/2021 231    • Neutrophil, Percent 03/23/2021 63    • Lymphocytes, Percent 03/23/2021 24    • Mono, Percent 03/23/2021 10    • Eosinophils, Percent 03/23/2021 2    • Basophils, Percent 03/23/2021 1    • Immature Granulocytes 03/23/2021 0    • Absolute Neutrophils 03/23/2021 3.3    • Absolute Lymphocytes 03/23/2021 1.3    • Absolute Monocytes 03/23/2021 0.5    • Absolute Eosinophils  03/23/2021 0.1    • Absolute Basophils 03/23/2021 0.0    • Absolute Immmature Granu* 03/23/2021 0.0    Hospital Outpatient Visit on 03/22/2021   Component Date Value   • Activated Clotting Time 03/22/2021 1.1    Lab Services on 03/20/2021   Component Date Value   • SARS-CoV-2 by PCR 03/20/2021 Not Detected    • Isolation Guidelines 03/20/2021                      Value:This result contains rich text formatting which cannot be displayed here.   • Procedural Notes 03/20/2021                      Value:This result contains rich text formatting which cannot be displayed here.   Lab Services on 03/09/2021   Component Date Value   • Sodium 03/09/2021 138    • Potassium 03/09/2021 3.8    • Chloride 03/09/2021 104    • Carbon Dioxide 03/09/2021 28    • Anion Gap 03/09/2021 10    • Glucose 03/09/2021 118*   • BUN 03/09/2021 11    • Creatinine 03/09/2021 0.63    • Glomerular  Filtration Ra* 03/09/2021 89*   • BUN/ Creatinine Ratio 03/09/2021 17    • Calcium 03/09/2021 9.6    • Bilirubin, Total 03/09/2021 0.5    • GOT/AST 03/09/2021 23    • GPT/ALT 03/09/2021 32    • Alkaline Phosphatase 03/09/2021 133*   • Albumin 03/09/2021 3.6    • Protein, Total 03/09/2021 6.8    • Globulin 03/09/2021 3.2    • A/G Ratio 03/09/2021 1.1    • CEA 03/09/2021 <0.5    • Cancer Antigen 19-9 03/09/2021 5    • Hepatitis B Surface Anti* 03/09/2021 Positive    • Hepatitis B Surface Anti* 03/09/2021 Negative    • Hepatitis B Core Antibod* 03/09/2021 Negative    • Hepatitis B Interpretati* 03/09/2021     • Hepatitis C Antibody 03/09/2021 Negative    • WBC 03/09/2021 8.7    • RBC 03/09/2021 4.23    • HGB 03/09/2021 12.0    • HCT 03/09/2021 37.1    • MCV 03/09/2021 87.7    • MCH 03/09/2021 28.4    • MCHC 03/09/2021 32.3    • RDW-CV 03/09/2021 12.6    • RDW-SD 03/09/2021 40.7    • PLT 03/09/2021 311    • Neutrophil, Percent 03/09/2021 73    • Lymphocytes, Percent 03/09/2021 17    • Mono, Percent 03/09/2021 7    • Eosinophils, Percent 03/09/2021 2    • Basophils, Percent 03/09/2021 1    • Immature Granulocytes 03/09/2021 0    • Absolute Neutrophils 03/09/2021 6.4    • Absolute Lymphocytes 03/09/2021 1.4    • Absolute Monocytes 03/09/2021 0.6    • Absolute Eosinophils  03/09/2021 0.2    • Absolute Basophils 03/09/2021 0.1    • Absolute Immmature Granu* 03/09/2021 0.0           ASSESSMENT/PLAN  1. Ampullary carcinoma. We will hold FOLFIRINOX this week due to neutropenia. After discussion with Dr. Oneill, we will decrease the doses of oxaliplatin and irinotecan by 10%, in addition to adding pegfilgrastim to day 3 of her next cycle.   2. Neutropenia. We reviewed neutropenic precautions and the importance of contacting our office at any time with fever 100.5 or above, as well as the on-call procedure for our office.   3. Nausea and vomiting. We reviewed the use of antiemetics. We will consider the use of lorazepam with  her next cycle.   4. Diarrhea. This responded well to loperamide.     Ms. Bhatt will return for a follow-up visit in 1 week. Should she have questions or concerns prior to her next visit, I have asked her to contact our office.       Jill Behm, NP       199.9

## 2024-02-20 NOTE — PRE PROCEDURE NOTE - PRE PROCEDURE EVALUATION
Attending Physician:            Radha Hercules            Procedure: Upper endoscopy and colonoscopy    Indication for Procedure: abdominal pain, rectal bleeding  ________________________________________________________  PAST MEDICAL & SURGICAL HISTORY:  BPH (benign prostatic hyperplasia)      S/P cholecystectomy      Personal history of spine surgery      History of surgery of head        ALLERGIES:  No Known Allergies    HOME MEDICATIONS:  Metamucil 1.7 g oral wafer: orally    AICD/PPM: [ ] yes   [ ] no    PERTINENT LAB DATA:                      PHYSICAL EXAMINATION:    Height (cm): 165.1  Weight (kg): 90.7  BMI (kg/m2): 33.3  BSA (m2): 1.98T(C): --  HR: --  BP: --  RR: --  SpO2: --    Constitutional: NAD  HEENT: PERRLA, EOMI,    Neck:  No JVD  Respiratory: CTAB/L  Cardiovascular: S1 and S2  Gastrointestinal: BS+, soft, NT/ND  Extremities: No peripheral edema  Neurological: A/O x 3, no focal deficits  Psychiatric: Normal mood, normal affect  Skin: No rashes    ASA Class: I [ ]  II [ x]  III [ ]  IV [ ]    COMMENTS:    The patient is a suitable candidate for the planned procedure unless box checked [ ]  No, explain:

## 2024-02-20 NOTE — ASU PATIENT PROFILE, ADULT - NSICDXPASTSURGICALHX_GEN_ALL_CORE_FT
PAST SURGICAL HISTORY:  History of surgery of head     Personal history of spine surgery     S/P cholecystectomy

## 2024-02-20 NOTE — ASU DISCHARGE PLAN (ADULT/PEDIATRIC) - CARE PROVIDER_API CALL
Radha Hercules  Gastroenterology  [address] [place] [address] [address]  [place] [address]  Phone: [phone]  Fax: [phone]  Follow Up Time:

## 2024-02-24 LAB — SURGICAL PATHOLOGY STUDY: SIGNIFICANT CHANGE UP

## 2024-02-25 ENCOUNTER — EMERGENCY (EMERGENCY)
Facility: HOSPITAL | Age: 79
LOS: 1 days | Discharge: HOME | End: 2024-02-25
Attending: EMERGENCY MEDICINE
Payer: MEDICARE

## 2024-02-25 ENCOUNTER — NON-APPOINTMENT (OUTPATIENT)
Age: 79
End: 2024-02-25

## 2024-02-25 VITALS
OXYGEN SATURATION: 99 % | DIASTOLIC BLOOD PRESSURE: 83 MMHG | RESPIRATION RATE: 18 BRPM | TEMPERATURE: 98 F | WEIGHT: 199.96 LBS | HEIGHT: 65 IN | HEART RATE: 97 BPM | SYSTOLIC BLOOD PRESSURE: 139 MMHG

## 2024-02-25 VITALS
OXYGEN SATURATION: 99 % | DIASTOLIC BLOOD PRESSURE: 76 MMHG | RESPIRATION RATE: 16 BRPM | HEART RATE: 98 BPM | TEMPERATURE: 98 F | SYSTOLIC BLOOD PRESSURE: 124 MMHG

## 2024-02-25 DIAGNOSIS — Z90.49 ACQUIRED ABSENCE OF OTHER SPECIFIED PARTS OF DIGESTIVE TRACT: Chronic | ICD-10-CM

## 2024-02-25 DIAGNOSIS — Z98.890 OTHER SPECIFIED POSTPROCEDURAL STATES: Chronic | ICD-10-CM

## 2024-02-25 PROBLEM — N40.0 BENIGN PROSTATIC HYPERPLASIA WITHOUT LOWER URINARY TRACT SYMPTOMS: Chronic | Status: ACTIVE | Noted: 2024-02-20

## 2024-02-25 LAB
ALBUMIN SERPL ELPH-MCNC: 4.1 G/DL — SIGNIFICANT CHANGE UP (ref 3.3–5)
ALP SERPL-CCNC: 121 U/L — HIGH (ref 40–120)
ALT FLD-CCNC: 52 U/L — HIGH (ref 10–45)
ANION GAP SERPL CALC-SCNC: 10 MMOL/L — SIGNIFICANT CHANGE UP (ref 5–17)
AST SERPL-CCNC: 38 U/L — SIGNIFICANT CHANGE UP (ref 10–40)
BASOPHILS # BLD AUTO: 0.01 K/UL — SIGNIFICANT CHANGE UP (ref 0–0.2)
BASOPHILS NFR BLD AUTO: 0.2 % — SIGNIFICANT CHANGE UP (ref 0–2)
BILIRUB SERPL-MCNC: 0.9 MG/DL — SIGNIFICANT CHANGE UP (ref 0.2–1.2)
BUN SERPL-MCNC: 13 MG/DL — SIGNIFICANT CHANGE UP (ref 7–23)
CALCIUM SERPL-MCNC: 9.3 MG/DL — SIGNIFICANT CHANGE UP (ref 8.4–10.5)
CHLORIDE SERPL-SCNC: 103 MMOL/L — SIGNIFICANT CHANGE UP (ref 96–108)
CO2 SERPL-SCNC: 25 MMOL/L — SIGNIFICANT CHANGE UP (ref 22–31)
CREAT SERPL-MCNC: 0.88 MG/DL — SIGNIFICANT CHANGE UP (ref 0.5–1.3)
EGFR: 87 ML/MIN/1.73M2 — SIGNIFICANT CHANGE UP
EOSINOPHIL # BLD AUTO: 0.09 K/UL — SIGNIFICANT CHANGE UP (ref 0–0.5)
EOSINOPHIL NFR BLD AUTO: 1.9 % — SIGNIFICANT CHANGE UP (ref 0–6)
FLUAV AG NPH QL: SIGNIFICANT CHANGE UP
FLUBV AG NPH QL: SIGNIFICANT CHANGE UP
GLUCOSE SERPL-MCNC: 124 MG/DL — HIGH (ref 70–99)
HCT VFR BLD CALC: 41.8 % — SIGNIFICANT CHANGE UP (ref 39–50)
HGB BLD-MCNC: 13.7 G/DL — SIGNIFICANT CHANGE UP (ref 13–17)
IMM GRANULOCYTES NFR BLD AUTO: 1 % — HIGH (ref 0–0.9)
LYMPHOCYTES # BLD AUTO: 1.41 K/UL — SIGNIFICANT CHANGE UP (ref 1–3.3)
LYMPHOCYTES # BLD AUTO: 29.3 % — SIGNIFICANT CHANGE UP (ref 13–44)
MCHC RBC-ENTMCNC: 28.4 PG — SIGNIFICANT CHANGE UP (ref 27–34)
MCHC RBC-ENTMCNC: 32.8 GM/DL — SIGNIFICANT CHANGE UP (ref 32–36)
MCV RBC AUTO: 86.7 FL — SIGNIFICANT CHANGE UP (ref 80–100)
MONOCYTES # BLD AUTO: 0.37 K/UL — SIGNIFICANT CHANGE UP (ref 0–0.9)
MONOCYTES NFR BLD AUTO: 7.7 % — SIGNIFICANT CHANGE UP (ref 2–14)
NEUTROPHILS # BLD AUTO: 2.88 K/UL — SIGNIFICANT CHANGE UP (ref 1.8–7.4)
NEUTROPHILS NFR BLD AUTO: 59.9 % — SIGNIFICANT CHANGE UP (ref 43–77)
NRBC # BLD: 0 /100 WBCS — SIGNIFICANT CHANGE UP (ref 0–0)
PLATELET # BLD AUTO: 160 K/UL — SIGNIFICANT CHANGE UP (ref 150–400)
POTASSIUM SERPL-MCNC: 3.9 MMOL/L — SIGNIFICANT CHANGE UP (ref 3.5–5.3)
POTASSIUM SERPL-SCNC: 3.9 MMOL/L — SIGNIFICANT CHANGE UP (ref 3.5–5.3)
PROT SERPL-MCNC: 7.9 G/DL — SIGNIFICANT CHANGE UP (ref 6–8.3)
RBC # BLD: 4.82 M/UL — SIGNIFICANT CHANGE UP (ref 4.2–5.8)
RBC # FLD: 14.6 % — HIGH (ref 10.3–14.5)
RSV RNA NPH QL NAA+NON-PROBE: SIGNIFICANT CHANGE UP
SARS-COV-2 RNA SPEC QL NAA+PROBE: SIGNIFICANT CHANGE UP
SODIUM SERPL-SCNC: 138 MMOL/L — SIGNIFICANT CHANGE UP (ref 135–145)
WBC # BLD: 4.81 K/UL — SIGNIFICANT CHANGE UP (ref 3.8–10.5)
WBC # FLD AUTO: 4.81 K/UL — SIGNIFICANT CHANGE UP (ref 3.8–10.5)

## 2024-02-25 PROCEDURE — 94640 AIRWAY INHALATION TREATMENT: CPT

## 2024-02-25 PROCEDURE — 85025 COMPLETE CBC W/AUTO DIFF WBC: CPT

## 2024-02-25 PROCEDURE — 80053 COMPREHEN METABOLIC PANEL: CPT

## 2024-02-25 PROCEDURE — 87637 SARSCOV2&INF A&B&RSV AMP PRB: CPT

## 2024-02-25 PROCEDURE — 71046 X-RAY EXAM CHEST 2 VIEWS: CPT

## 2024-02-25 PROCEDURE — 99284 EMERGENCY DEPT VISIT MOD MDM: CPT

## 2024-02-25 PROCEDURE — 71046 X-RAY EXAM CHEST 2 VIEWS: CPT | Mod: 26

## 2024-02-25 PROCEDURE — 99283 EMERGENCY DEPT VISIT LOW MDM: CPT | Mod: 25

## 2024-02-25 RX ORDER — OMEPRAZOLE 10 MG/1
1 CAPSULE, DELAYED RELEASE ORAL
Refills: 0 | DISCHARGE

## 2024-02-25 RX ORDER — ACETAMINOPHEN 500 MG
650 TABLET ORAL ONCE
Refills: 0 | Status: COMPLETED | OUTPATIENT
Start: 2024-02-25 | End: 2024-02-25

## 2024-02-25 RX ORDER — IPRATROPIUM/ALBUTEROL SULFATE 18-103MCG
3 AEROSOL WITH ADAPTER (GRAM) INHALATION ONCE
Refills: 0 | Status: COMPLETED | OUTPATIENT
Start: 2024-02-25 | End: 2024-02-25

## 2024-02-25 RX ORDER — PSYLLIUM SEED (WITH DEXTROSE)
0 POWDER (GRAM) ORAL
Refills: 0 | DISCHARGE

## 2024-02-25 RX ADMIN — Medication 3 MILLILITER(S): at 10:53

## 2024-02-25 RX ADMIN — Medication 650 MILLIGRAM(S): at 11:22

## 2024-02-25 NOTE — ED PROVIDER NOTE - PHYSICAL EXAMINATION
Physical Exam:  Gen:  congested appearing, non-toxic appearing  Head: NCAT  HEENT: Normal conjunctiva, trachea midline, moist mucous membranes  Lung:  no respiratory distress, speaking in full sentences  CV: RRR, no murmurs, rubs or gallops  Abd: Soft, non-tender, non-distended,   MSK: moves all four extremities,  Neuro: No focal motor deficits  Skin: Warm, well perfuse, no visible rashes, no leg swelling  Psych: appropriate affect and mood Physical Exam:  Gen:  congested appearing, non-toxic appearing  Head: NCAT  HEENT: Normal conjunctiva, trachea midline, moist mucous membranes  Lung:  no respiratory distress, speaking in full sentences  CV: RRR, no murmurs, rubs or gallops  Abd: Soft, non-tender, non-distended,   MSK: moves all four extremities,  Neuro: No focal motor deficits  Skin: Warm, well perfuse, no visible rashes, no leg swelling  Psych: appropriate affect and mood  Attending Mely Ni: Gen: NAD, heent: atrauamtic, eomi, perrla, mmm, op pink, uvula midline,no trismus, no stridor neck; nttp, no nuchal rigidity, chest: nttp, no crepitus, cv: rrr, no murmurs, lungs: ctab, abd: soft, nontender, nondistended, no peritoneal signs, , no guarding, ext: wwp, neg homans, skin: no rash, neuro: awake and alert, following commands, speech clear, sensation and strength intact, no focal deficits

## 2024-02-25 NOTE — ED PROVIDER NOTE - CLINICAL SUMMARY MEDICAL DECISION MAKING FREE TEXT BOX
78-year-old male past medical history of BPH presents to the ED for cold-like symptoms for the last 4 days.  Patient is endorsing nasal congestion, sore throat and chills but no fevers.  He states on the 20th he had an endoscopy and colonoscopy.  He states that while he was in the hospital room was very cold and thinks that may be causing his symptoms.  He states that over the course of 4 days his symptoms have improved.  He is taking cough medication but no Tylenol/ibuprofen.  He denies any chest pain, headache, dizziness, nausea, vomiting, abdominal pain, urinary symptoms, diarrhea or constipation.    Symptoms most consistent with viral etiology.  Will swab him for flu/RSV here in the ED and get a chest x-ray for any superimposed pneumonia.  Low likelihood concern for perforation or pancreatitis status post procedure given that the patient's abdomen is soft nontender.  Patient is afebrile now and does not require analgesics will continue to monitor I suspect patient will be discharged 78-year-old male past medical history of BPH presents to the ED for cold-like symptoms for the last 4 days.  Patient is endorsing nasal congestion, sore throat and chills but no fevers.  He states on the 20th he had an endoscopy and colonoscopy.  He states that while he was in the hospital room was very cold and thinks that may be causing his symptoms.  He states that over the course of 4 days his symptoms have improved.  He is taking cough medication but no Tylenol/ibuprofen.  He denies any chest pain, headache, dizziness, nausea, vomiting, abdominal pain, urinary symptoms, diarrhea or constipation.    Symptoms most consistent with viral etiology.  Will swab him for flu/RSV here in the ED and get a chest x-ray for any superimposed pneumonia.  Low likelihood concern for perforation or pancreatitis status post procedure given that the patient's abdomen is soft nontender.  Patient is afebrile now and does not require analgesics will continue to monitor I suspect patient will be discharged  Attending Mely Ni: 78-year-old male presenting with cough, congestion and sore throat for the last couple days.  Patient states symptoms started having endoscopy and colonoscopy.  Upon arrival patient awake alert following commands.  On exam abdomen soft nontender patient denies any abdominal pain and no black or bloody stools making acute perforation or organ injury secondary to colonoscopy unlikely.  Patient is also hemodynamically stable.  Oropharynx pink without any evidence of any exudates and patient handling secretions without any evidence of any trismus.  Less likely PTA or acute strep infection.  Lungs are clear to auscultation bilaterally and patient without any evidence of hypoxia or fever making acute pneumonia less likely.  Suspect patient may have a viral illness however with age will also check chest x-ray and basic blood work to further evaluate as well as viral swab.  Symptomatic treatment as needed.  Will reeval.   services used.

## 2024-02-25 NOTE — ED ADULT NURSE NOTE - AS TEMP SITE
[facility] Call Center    Phone Message    May a detailed message be left on voicemail: yes     Reason for Call: Appointment Intake    Referring Provider Name: Dr. Mendiola  Diagnosis and/or Symptoms:   Parent requesting general nutrition appt regarding weight loss/failure to thrive. Nutrition appt covid instructions are still clinic review, so sending encounter for scheduling     Action Taken: Other: Peds Nutrition    Travel Screening: Not Applicable                                                                      
oral

## 2024-02-25 NOTE — ED PROVIDER NOTE - ATTENDING CONTRIBUTION TO CARE
Attending MD Mely Ni:  I personally have seen and examined this patient.  Resident note reviewed and agree on plan of care and except where noted.  See HPI, PE, and MDM for details.

## 2024-02-25 NOTE — ED PROVIDER NOTE - PATIENT PORTAL LINK FT
You can access the FollowMyHealth Patient Portal offered by [facility] by registering at the following website: [email or web address]. By joining [facility]’s FollowMyHealth portal, you will also be able to view your health information using other applications (apps) compatible with our system.

## 2024-02-25 NOTE — ED PROVIDER NOTE - NSFOLLOWUPINSTRUCTIONS_ED_ALL_ED_FT
You were evaluated in the Emergency Department today for your congestion, cough and sore throat. Your evaluation suggests that your symptoms are most likely due to a viral illness, which will improve on its own with rest and fluids.    ***We recommend you take 600mg ibuprofen every 6 hours or tylenol 650mg every 6 hours as needed for fever. If needed, you can alternate these medications so that you take one medication every 3 hours. For instance, at noon take ibuprofen, then at 3pm take tylenol, then at 6pm take ibuprofen.    Please schedule an appointment for follow up with your primary care physician this week.    Return to the Emergency Department if you experience worsening cough, fever 100.4 ° F or greater not controlled by Tylenol or Ibuprofen, recurrent vomiting, chest pain, shortness of breath, or any other concerning symptoms.

## 2024-02-25 NOTE — ED PROVIDER NOTE - OBJECTIVE STATEMENT
78-year-old male past medical history of BPH presents to the ED for cold-like symptoms for the last 4 days.  Patient is endorsing nasal congestion, sore throat and chills but no fevers.  He states on the 20th he had an endoscopy and colonoscopy.  He states that while he was in the hospital room was very cold and thinks that may be causing his symptoms.  He states that over the course of 4 days his symptoms have improved.  He is taking cough medication but no Tylenol/ibuprofen.  He denies any chest pain, headache, dizziness, nausea, vomiting, abdominal pain,urinary symptoms, diarrhea or constipation.

## 2024-02-29 ENCOUNTER — EMERGENCY (EMERGENCY)
Facility: HOSPITAL | Age: 79
LOS: 1 days | Discharge: HOME | End: 2024-02-29
Attending: EMERGENCY MEDICINE
Payer: MEDICARE

## 2024-02-29 VITALS
DIASTOLIC BLOOD PRESSURE: 81 MMHG | WEIGHT: 184.97 LBS | SYSTOLIC BLOOD PRESSURE: 158 MMHG | TEMPERATURE: 98 F | OXYGEN SATURATION: 98 % | HEIGHT: 65 IN | RESPIRATION RATE: 19 BRPM | HEART RATE: 87 BPM

## 2024-02-29 DIAGNOSIS — Z98.890 OTHER SPECIFIED POSTPROCEDURAL STATES: Chronic | ICD-10-CM

## 2024-02-29 DIAGNOSIS — Z90.49 ACQUIRED ABSENCE OF OTHER SPECIFIED PARTS OF DIGESTIVE TRACT: Chronic | ICD-10-CM

## 2024-02-29 PROCEDURE — 99283 EMERGENCY DEPT VISIT LOW MDM: CPT | Mod: FS

## 2024-02-29 PROCEDURE — 99282 EMERGENCY DEPT VISIT SF MDM: CPT

## 2024-02-29 NOTE — ED ADULT TRIAGE NOTE - CHIEF COMPLAINT QUOTE
cough x week - pt seen here a few days ago for same symptoms- neg on all tests. pt prescribed tessalon- has continued cough

## 2024-02-29 NOTE — ED PROVIDER NOTE - NSFOLLOWUPINSTRUCTIONS_ED_ALL_ED_FT
1. Follow up with your gastroenterologist within 2-3 days. Follow up with internal medicine within 1 week. Someone will contact you to help you establish care with a primary care doctor.   2. Please take Omeprazole daily as directed by your gastroenterologist.   3. Take Ibuprofen (i.e. Motrin, Advil) 600mg every 8 hrs for pain as needed. Take with food.   May alternate with Acetaminophen (Tylenol) 650mg every 6 hours for pain as needed.  4. Take Tessalon pearls as needed for cough.   5. Return to the emergency department if you have worsening pain, fever, coughing, dizziness, fainting, weakness or all other concerning symptoms.

## 2024-02-29 NOTE — ED PROVIDER NOTE - ATTENDING APP SHARED VISIT CONTRIBUTION OF CARE
I performed a history and physical exam of the patient and discussed their management with the resident and /or advanced care provider. I reviewed the resident and /or ACP's note and agree with the documented findings and plan of care. My medical decision making and observations are found above.  Lungs clear, abd soft, nl RR,

## 2024-02-29 NOTE — ED PROVIDER NOTE - PATIENT PORTAL LINK FT
You can access the FollowMyHealth Patient Portal offered by [facility] by registering at the following website: [email or web address]. By joining [facility]’s FollowMyHealth portal, you will also be able to view your health information using other applications (apps) compatible with our system.

## 2024-02-29 NOTE — ED PROVIDER NOTE - PHYSICAL EXAMINATION
CONSTITUTIONAL: Patient is awake, alert and oriented x 3. Patient is well appearing and in no acute distress.  HEAD: NCAT  EYES: PERRL bilaterally,   ENT: Airway patent, Nasal mucosa clear.   NECK: Supple,  LUNGS: CTA B/L, no wheezes, rhonci or rales  HEART: RRR.+S1S2  ABDOMEN: Soft, non-tender to palpation throughout all four quadrants,   MSK: FROM upper and lower ext b/l,   SKIN: No rash or lesions  NEURO: No focal deficits,

## 2024-02-29 NOTE — ED PROVIDER NOTE - CLINICAL SUMMARY MEDICAL DECISION MAKING FREE TEXT BOX
Pat: Patient presents with persistent cough this occurred after GI procedure.  Patient was here a few days ago had chest x-ray and viral labs all negative.  History does sound more like viral syndrome but because GERD is in the differential will educate as to the use of PPIs and ensure good follow-up.  Do not think that reimaging or new labs would be helpful at this time.  Patient is getting some relief with this test.

## 2024-02-29 NOTE — ED PROVIDER NOTE - OBJECTIVE STATEMENT
79 y/o male with pmhx of bph, gerd, gastritis presents to the ED complaining of cough x 10 days. Patient states that this all began after he had an endoscopy/colonoscopy on 2/20/24. Patient states that he was seen at [facility] ED for cough on 2/25/24. He had cxr, viral swab and labs which were unremarkable. He has been taking tessalon pearls which help his cough temporarily. Cough is worse at night. It is non-productive. Feels soreness to the chest when he coughs. No shortness of breath. No fevers, dizziness, weakness, vomiting, abdominal pain, n/v/d. Was prescribed omeprazole by GI doctor daily but has forgotten to take some days.

## 2024-02-29 NOTE — ED PROVIDER NOTE - NSFOLLOWUPCLINICS_GEN_ALL_ED_FT
[facility] General Internal Medicine  General Internal Medicine  [address] [place] [address]  Phone: [phone]  Fax:

## 2024-02-29 NOTE — ED PROVIDER NOTE - CARE PLAN
Principal Discharge DX:	Viral URI with cough  Secondary Diagnosis:	GERD (gastroesophageal reflux disease)   1

## 2024-02-29 NOTE — ED PROVIDER NOTE - PROGRESS NOTE DETAILS
Counseled patient on importance of adhering to taking his omeprazole as this could help with his symptoms. Also instructed him to continue taking tessalon pearls as needed for cough. Will f/u with his GI doctor. Will refer him to internal medicine to establish care with a PCP. Sherita Ewing PA-C

## 2024-03-04 ENCOUNTER — NON-APPOINTMENT (OUTPATIENT)
Age: 79
End: 2024-03-04

## 2024-03-19 ENCOUNTER — APPOINTMENT (OUTPATIENT)
Dept: GASTROENTEROLOGY | Facility: CLINIC | Age: 79
End: 2024-03-19

## 2025-01-27 ENCOUNTER — NON-APPOINTMENT (OUTPATIENT)
Age: 80
End: 2025-01-27

## 2025-08-26 ENCOUNTER — NON-APPOINTMENT (OUTPATIENT)
Age: 80
End: 2025-08-26

## (undated) DEVICE — FORCEP RADIAL JAW 4 JUMBO 2.8MM 3.2MM 240CM ORANGE DISP

## (undated) DEVICE — SENSOR O2 FINGER ADULT

## (undated) DEVICE — SUCTION YANKAUER NO CONTROL VENT

## (undated) DEVICE — BITE BLOCK ADULT 20 X 27MM (GREEN)

## (undated) DEVICE — BRUSH COLONOSCOPY CYTOLOGY

## (undated) DEVICE — SOL INJ NS 0.9% 500ML 2 PORT

## (undated) DEVICE — TUBING IV SET GRAVITY 3Y 100" MACRO

## (undated) DEVICE — SYR ALLIANCE II INFLATION 60ML

## (undated) DEVICE — ENDOCUFF VISION SZ 2 LG GRN

## (undated) DEVICE — POLY TRAP ETRAP

## (undated) DEVICE — PACK IV START WITH CHG

## (undated) DEVICE — IRRIGATOR BIO SHIELD

## (undated) DEVICE — SYR LUER LOK 50CC

## (undated) DEVICE — BIOPSY FORCEP RADIAL JAW 4 STANDARD WITH NEEDLE

## (undated) DEVICE — CLAMP BX HOT RAD JAW 3

## (undated) DEVICE — BALLOON US ENDO

## (undated) DEVICE — FOLEY HOLDER STATLOCK 2 WAY ADULT

## (undated) DEVICE — CATH IV SAFE BC 22G X 1" (BLUE)

## (undated) DEVICE — TUBING SUCTION 20FT

## (undated) DEVICE — TUBING SUCTION CONN 6FT STERILE

## (undated) DEVICE — CATH IV SAFE BC 20G X 1.16" (PINK)

## (undated) DEVICE — ELCTR GROUNDING PAD ADULT COVIDIEN

## (undated) DEVICE — SNARE EXACTO COLD 9MMX230CM